# Patient Record
Sex: FEMALE | Race: WHITE | HISPANIC OR LATINO | Employment: UNEMPLOYED | URBAN - METROPOLITAN AREA
[De-identification: names, ages, dates, MRNs, and addresses within clinical notes are randomized per-mention and may not be internally consistent; named-entity substitution may affect disease eponyms.]

---

## 2017-04-07 ENCOUNTER — HOSPITAL ENCOUNTER (EMERGENCY)
Facility: HOSPITAL | Age: 47
Discharge: HOME | End: 2017-04-07
Attending: EMERGENCY MEDICINE

## 2017-04-07 VITALS
RESPIRATION RATE: 18 BRPM | OXYGEN SATURATION: 98 % | HEIGHT: 61 IN | SYSTOLIC BLOOD PRESSURE: 154 MMHG | DIASTOLIC BLOOD PRESSURE: 69 MMHG | HEART RATE: 99 BPM | WEIGHT: 145 LBS | BODY MASS INDEX: 27.38 KG/M2 | TEMPERATURE: 99 F

## 2017-04-07 DIAGNOSIS — J06.9 UPPER RESPIRATORY TRACT INFECTION, UNSPECIFIED TYPE: Primary | ICD-10-CM

## 2017-04-07 DIAGNOSIS — R07.1 CHEST PAIN VARYING WITH BREATHING: ICD-10-CM

## 2017-04-07 PROCEDURE — 99283 EMERGENCY DEPT VISIT LOW MDM: CPT

## 2017-04-07 NOTE — ED AVS SNAPSHOT
[facility]  [id number] [place] [id number]               Tasia Hatch   2017  9:29 PM   ED    Description:  Female : 1970   Department:  [facility]           Your Care was Coordinated By:     Provider Role From To    Brooks De Leon MD Attending Provider 17 2383 --      Reason for Visit     Cough           Diagnoses this Visit        Comments    Upper respiratory tract infection, unspecified type    -  Primary     Chest pain varying with breathing           ED Disposition     None           To Do List           Follow-up Information     Follow up with Primary Doctor No.        Follow up with [facility] In 1 day.    Specialty:  Emergency Medicine    Why:  If symptoms worsen.  Patient should return to the ED for any concerns or worsening of condition    Contact information:    [address] [place] [id number] [phone]      [facility]     [facility] Nurse Care Line - 24/ Assistance  Unless otherwise directed by your provider, please contact [facility], our nurse care line that is available for / assistance.     Registered nurses in the [facility] provide: appointment scheduling, clinical advisement, health education, and other advisory services.  Call: [phone] (toll free)               Medications           Message regarding Medications     Verify the changes and/or additions to your medication regime listed below are the same as discussed with your clinician today.  If any of these changes or additions are incorrect, please notify your healthcare provider.             Verify that the below list of medications is an accurate representation of the medications you are currently taking.  If none reported, the list may be blank. If incorrect, please contact your healthcare provider. Carry this list with you in case of emergency.               "  Clinical Reference Information           Your Vitals Were     BP Pulse Temp Resp Height Weight    154/69 (BP Location: Right arm, Patient Position: Sitting) 99 99 °F (37.2 °C) (Oral) 18 5' 1" (1.549 m) 65.8 kg (145 lb)    SpO2 BMI             98% 27.4 kg/m2         Allergies as of 4/7/2017     No Known Allergies      Immunizations Administered on Date of Encounter - 4/7/2017     None      ED Micro, Lab, POCT     None      ED Imaging Orders     Start Ordered       Status Ordering Provider    04/07/17 2136 04/07/17 2135  X-Ray Chest PA And Lateral  1 time imaging      Final result       Discharge References/Attachments     COLDS, ADULT SELF-CARE FOR (ENGLISH)      [facility] Sign-Up     Activating your [facility] account is as easy as 1-2-3!     1) Visit [email or web address], select Sign Up Now, enter this activation code and your date of birth, then select Next.  [email or web address]  Expires: 5/22/2017  9:53 PM      2) Create a username and password to use when you visit [facility] in the future and select a security question in case you lose your password and select Next.    3) Enter your e-mail address and click Sign Up!    Additional Information  If you have questions, please e-mail [email or web address] or call [phone] to talk to our [facility] staff. Remember, [facility] is NOT to be used for urgent needs. For medical emergencies, dial 911.         Smoking Cessation     If you would like to quit smoking:   You may be eligible for free services if you are a [place] resident and started smoking cigarettes before September 1, 1988.  Call the Smoking Cessation [employer] (SCT) toll free at (751) 696-0897 or (446) 344-2263.   Call 3-800-QUIT-NOW if you do not meet the above criteria.   Contact us via email: [email or web address]   View our website for more information: [email or web address]         [facility]  complies with applicable Federal civil rights laws and does not discriminate on the basis " of race, color, national origin, age, disability, or sex.        Language Assistance Services     ATTENTION: Language assistance services are available, free of charge. Please call [phone].      ATENCIÓN: Si habla hali, tiene a steiner disposición servicios gratuitos de asistencia lingüística. Llame al [phone].     CHÚ Ý: N?u b?n nói Ti?ng Vi?t, có các d?ch v? h? tr? ngôn ng? mi?n phí dành cho b?n. G?i s? [phone].

## 2017-04-08 NOTE — ED PROVIDER NOTES
SCRIBE #1 NOTE: I, Sweetie Stewart, am scribing for, and in the presence of, Brooks De Leon MD. I have scribed the entire note.      History      Chief Complaint   Patient presents with    Cough     pt with dry cough x 3 days ago.  nonproductive.  Pt hoarse       Review of patient's allergies indicates:  No Known Allergies     HPI   HPI    4/7/2017, 9:31 PM   History obtained from the patient      History of Present Illness: Tasia Hatch is a 46 y.o. female patient who presents to the Emergency Department for nonproductive cough which onset gradually 3 days ago. Pt's  reports having similar sxs a week ago. Symptoms are constant and moderate in severity. No mitigating or exacerbating factors reported. Associated sxs include congestion and CP with inspiration. Prior treatment includes Mucinex, with minimal relief. Patient denies any fever, chills, rhinorrhea, sore throat, N/V, and all other sxs at this time. No further complaints or concerns at this time.     Arrival mode: Personal vehicle      PCP: Primary Doctor No     Past Medical History:  Past medical history reviewed not relevant      Past Surgical History:  Past surgical history reviewed not relevant      Family History:  Family history reviewed not relevant      Social History:  Social History    Social History Main Topics    Social History Main Topics    Smoking status: Unknown if ever smoked    Smokeless tobacco: Unknown if ever used    Alcohol Use: Unknown drinking history    Drug Use: Unknown if ever used    Sexual Activity: Unknown       ROS   Review of Systems   Constitutional: Negative for chills and fever.   HENT: Positive for congestion. Negative for rhinorrhea and sore throat.    Respiratory: Positive for cough (nonproductive). Negative for shortness of breath.    Cardiovascular: Positive for chest pain (with inspiration).   Gastrointestinal: Negative for nausea and vomiting.   Genitourinary: Negative for dysuria.   Musculoskeletal:  "Negative for back pain.   Skin: Negative for rash.   Neurological: Negative for weakness.   Hematological: Does not bruise/bleed easily.   All other systems reviewed and are negative.      Physical Exam    Initial Vitals   BP Pulse Resp Temp SpO2   04/07/17 2039 04/07/17 2039 04/07/17 2039 04/07/17 2039 04/07/17 2039   154/69 99 18 99 °F (37.2 °C) 98 %      Physical Exam  Nursing Notes and Vital Signs Reviewed.  Constitutional: Patient is in no acute distress. Awake and alert. Well-developed and well-nourished.  Head: Atraumatic. Normocephalic.  Eyes: PERRL. EOM intact. Conjunctivae are not pale. No scleral icterus.  ENT: Mucous membranes are moist. Oropharynx is clear and symmetric.    Neck: Supple. Full ROM. No lymphadenopathy.  Cardiovascular: Regular rate. Regular rhythm. No murmurs, rubs, or gallops. Distal pulses are 2+ and symmetric.  Pulmonary/Chest: No respiratory distress. Clear to auscultation bilaterally. No wheezing, rales, or rhonchi.  Abdominal: Soft and non-distended.  There is no tenderness.  No rebound, guarding, or rigidity. Good bowel sounds.  Genitourinary: No CVA tenderness  Musculoskeletal: Moves all extremities. No obvious deformities. No edema. No calf tenderness.  Skin: Warm and dry.  Neurological:  Alert, awake, and appropriate.  Normal speech.  Grossly intact neurovascularly.  Psychiatric: Normal affect. Good eye contact. Appropriate in content.    ED Course    Procedures  ED Vital Signs:  Vitals:    04/07/17 2039   BP: (!) 154/69   Pulse: 99   Resp: 18   Temp: 99 °F (37.2 °C)   TempSrc: Oral   SpO2: 98%   Weight: 65.8 kg (145 lb)   Height: 5' 1" (1.549 m)       Abnormal Lab Results:  Labs Reviewed - No data to display     All Lab Results:      Imaging Results:  Imaging Results         X-Ray Chest PA And Lateral (Final result) Result time:  04/07/17 21:44:03    Final result by Cleveland Khan MD (04/07/17 21:44:03)    Impression:         Negative two-view chest " x-ray.      Electronically signed by: VERÓNICA REDDING MD  Date:     04/07/17  Time:    21:44     Narrative:    XR CHEST PA AND LATERAL, 04/07/17 21:43:04    Clinical indication: Acute chest pain    Findings:   Heart size is normal. The lung fields are clear. No acute pulmonary infiltrate.                    The Emergency Provider reviewed the vital signs and test results, which are outlined above.    ED Discussion     Based on patient presentation consistent with upper respiratory sxs, CP is not associated with cardiac ischemia or PE.     9:53 PM: Reassessed pt at this time.  Discussed with pt negative X-ray results. Discussed with pt all pertinent ED information and results. Discussed pt dx of upper respiratory tract infection and plan of tx. Gave pt all f/u and return to the ED instructions. All questions and concerns were addressed at this time. Pt expresses understanding of information and instructions, and is comfortable with plan to discharge. Pt is stable for discharge.  Made suggestions for OTC medication to assist with upper respiratory infection.    Pre-hypertension/Hypertension: The pt has been informed that they may have pre-hypertension or hypertension based on a blood pressure reading in the ED. I recommend that the pt call the PCP listed on their discharge instructions or a physician of their choice this week to arrange f/u for further evaluation of possible pre-hypertension or hypertension. '    I discussed with patient and/or family/caretaker that evaluation in the ED does not suggest any emergent or life threatening medical conditions requiring immediate intervention beyond what was provided in the ED, and I believe patient is safe for discharge.  Regardless, an unremarkable evaluation in the ED does not preclude the development or presence of a serious of life threatening condition. As such, patient was instructed to return immediately for any worsening or change in current symptoms.    ED  Medication(s):  Medications - No data to display    There are no discharge medications for this patient.      Follow-up Information     Follow up with Primary Doctor No.        Follow up with [facility] In 1 day.    Specialty:  Emergency Medicine    Why:  If symptoms worsen.  Patient should return to the ED for any concerns or worsening of condition    Contact information:    [address]  [place] [address] [phone]            Medical Decision Making    Medical Decision Making:   Clinical Tests:   Radiological Study: Ordered and Reviewed           Scribe Attestation:   Scribe #1: I performed the above scribed service and the documentation accurately describes the services I performed. I attest to the accuracy of the note.    Attending:   Physician Attestation Statement for Scribe #1: I, Brooks De Leon MD, personally performed the services described in this documentation, as scribed by Sweetie William, in my presence, and it is both accurate and complete.          Clinical Impression       ICD-10-CM ICD-9-CM   1. Upper respiratory tract infection, unspecified type J06.9 465.9   2. Chest pain varying with breathing R07.9 786.50       Disposition:   Disposition: Discharged  Condition: Stable         Brooks De Leon MD  04/08/17 0041